# Patient Record
Sex: MALE | Race: WHITE | URBAN - METROPOLITAN AREA
[De-identification: names, ages, dates, MRNs, and addresses within clinical notes are randomized per-mention and may not be internally consistent; named-entity substitution may affect disease eponyms.]

---
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
## 2024-08-11 NOTE — CONSULTS
[facility] Pediatric Hospitalist  Consult     Norman Johnson MRN# [id number]   YOB: 2005 Age: 12 year old      Date of Admission:  5/8/2018           Chief Complaint:   S/p ORIF closed L both bone forearm fracture.    History is obtained from the patient, electronic health record, ortho NP, and patient's father         History of Present Illness:   11 yo RHD M who was playing soccer last night and fell onto his L wrist. Had immediate pain and deformity of L wrist. No numbness/tingling. No other injuries. Brought to ED where attempted closed reduction was performed and was unsuccessful.    Earlier today he underwent ORIF which was uncomplicated. He has not required opioids or other IV medications since post-op and is now tolerating PO fluids and some solids. He voided just prior to my evaluation.             Past Medical History:   I have reviewed and updated this patient's past medical history  Active Ambulatory Problems     Diagnosis Date Noted     No Active Ambulatory Problems     Resolved Ambulatory Problems     Diagnosis Date Noted     No Resolved Ambulatory Problems     No Additional Past Medical History             Past Surgical History:   I have reviewed and updated this patient's past surgical history  History reviewed. No pertinent surgical history.          Social History:   I have reviewed and updated this patient's social history      - Lives at home with family.          Family History:   I have reviewed and updated this patient's family history  History reviewed. No pertinent family history.         Immunizations:   Immunizations are up to date - reported         Allergies:   No Known Allergies          Medications:   I have reviewed this patient's current medications  None         Review of Systems:   General: normal energy and appetite.  Skin: no rash, hives, swelling, other lesions.   Eyes: no pain, discharge, redness, itching.   ENT:No ear or throat pain, no runny  "nose  Respiratory: no cough, wheeze, respiratory distress.   Cardiovascular: no tachycardia, palpitations, syncope.   Gastrointestinal: no nausea, vomiting, diarrhea, constipation, abdominal pain.   Musculoskeletal: as noted  Urinary: no dysuria, frequency, urgency. Initially with some post-op urinary retention but is now voiding.  Hematology: no anemia, bleeding disorder, abnormal lymph nodes, night sweats.   Neurology: no weakness, tingling, numbness, headache, syncope.   The 10 point Review of Systems is otherwise negative other than noted in the HPI and above           Physical Exam:   Vitals were reviewed  Initial vitals were reviewed  /54  Temp 98.2  F (36.8  C) (Oral)  Resp 16  Ht 1.664 m (5' 5.5\")  Wt 47.9 kg (105 lb 11.2 oz)  SpO2 98%  BMI 17.32 kg/m2    Constitutional:   Awake, afebrile, NTNAD, well nourished/well hydrated, appropriate responses   Head:         NCAT  Eyes:   PERRLA, EOMI, sclerae anicteric, no conjunctival injection   ENT:   mucosal membranes moist and pink   Neck:   Supple, normal ROM, trachea midline, no stridor   Hematologic / Lymphatic:   no cervical or supraclavicular lymphadenopathy   Back:   Symmetric, no curvature   Lungs:   CTAB, good aeration to bases, no WRR   Cardiovascular:   RRR with normal S1/S1, no murmur, no rubs, clicks or gallops.  2+ peripheral pulses bilaterally   Chest:   Symmetric chest wall motion, no sternal retractions   Abdomen:   Soft, non-tender, non-distended.  No guarding or peritoneal signs. No hepatomegaly, no splenomegaly. Normally active bowel sounds   Musculoskeletal/Neurologic:   Normal strength and tone, CNs grossly intact, no focal deficits/neurologically intact.   Skin:   No rashes, edema or ecchymosis.          Data:   LEFT FOREARM TWO VIEWS     5/7/2018 8:46 PM      HISTORY: Fall onto left arm with deformity at distal radius and ulna.      COMPARISON: None.         IMPRESSION: Acute distal shaft fractures of the radius and ulna. " There  is a degree of fracture fragment overlap. Acute mildly distracted  fracture of the ulnar styloid process.      HAIR GALVEZ MD          Assessment and Plan:   11yo health M s/p ORIF closed L both bone forearm fracture. Tolerating PO, pain well controlled without IV medications, able to void.       -Seems to be meeting discharge criteria at this point.  -For pain control I would recommend acetaminophen and/or ibuprofen, then oral oxycodone PRN for breakthrough pain. I entered these orders (small supply of 5mg oxycodone) as requested and discussed with the family. I would not recommend combination pain meds (such as Norco) in any pediatric patient.  -I also wrote for a small supply of oral ondansetron should any postoperative N/V develop after discharge.  -Remainder of plan including f/u and PT per primary ortho team.        Thank you very much for this interesting consult and allowing us to be involved in this patient's care.    Attestation:  This patient was seen and evaluated by me. I have reviewed the the medical record in detail, including vital signs, notes, medications, labs and imaging.  I have discussed this care plan with the patient, family and care team.    Von Olson MD  Pediatric Hospitalist  [facility]  Pager [phone]      Back pain

## (undated) DEVICE — NDL COUNTER 20CT 31142493

## (undated) DEVICE — SYR 30ML LL W/O NDL 302832

## (undated) DEVICE — NDL 22GA 1.5"

## (undated) DEVICE — CAST BUCKET

## (undated) DEVICE — LINEN FULL SHEET 5511

## (undated) DEVICE — GLOVE PROTEXIS POWDER FREE 8.5 ORTHOPEDIC 2D73ET85

## (undated) DEVICE — BAG CLEAR TRASH 1.3M 39X33" P4040C

## (undated) DEVICE — DRILL BIT QUICK COUPLING 2.5X110MM GOLD 310.25

## (undated) DEVICE — PACK MINOR LITHOTOMY RIDGES

## (undated) DEVICE — ESU GROUND PAD ADULT W/CORD E7507

## (undated) DEVICE — SU MONOCRYL 4-0 PS-2 27" UND Y426H

## (undated) DEVICE — LINEN HALF SHEET 5512

## (undated) DEVICE — DRSG ABDOMINAL 07 1/2X8" 7197D

## (undated) DEVICE — CAST PADDING 4" STERILE 9044S

## (undated) DEVICE — GLOVE PROTEXIS BLUE W/NEU-THERA 8.5  2D73EB85

## (undated) DEVICE — DRSG STERI STRIP 1/2X4" R1547

## (undated) DEVICE — SU VICRYL 3-0 SH 27" J316H

## (undated) DEVICE — DRSG ADAPTIC 3X8" 6113

## (undated) DEVICE — DRAPE C-ARM MINI 5423

## (undated) DEVICE — CAST PADDING 4" UNSTERILE 9044

## (undated) DEVICE — SU VICRYL 0 CT-2 27" J334H

## (undated) DEVICE — IMM SHOULDER MED 79-84015

## (undated) DEVICE — CAST PLASTER SPLINT 4X15" EXTRA FAST

## (undated) DEVICE — SU VICRYL 3-0 SH 27" UND J416H

## (undated) DEVICE — SYR BULB IRRIG DOVER 60 ML LATEX FREE 67000

## (undated) RX ORDER — DEXAMETHASONE SODIUM PHOSPHATE 4 MG/ML
INJECTION, SOLUTION INTRA-ARTICULAR; INTRALESIONAL; INTRAMUSCULAR; INTRAVENOUS; SOFT TISSUE
Status: DISPENSED
Start: 2018-05-08

## (undated) RX ORDER — GLYCOPYRROLATE 0.2 MG/ML
INJECTION INTRAMUSCULAR; INTRAVENOUS
Status: DISPENSED
Start: 2018-05-08

## (undated) RX ORDER — FENTANYL CITRATE 50 UG/ML
INJECTION, SOLUTION INTRAMUSCULAR; INTRAVENOUS
Status: DISPENSED
Start: 2018-05-08

## (undated) RX ORDER — BUPIVACAINE HYDROCHLORIDE 2.5 MG/ML
INJECTION, SOLUTION EPIDURAL; INFILTRATION; INTRACAUDAL
Status: DISPENSED
Start: 2018-05-08

## (undated) RX ORDER — PROPOFOL 10 MG/ML
INJECTION, EMULSION INTRAVENOUS
Status: DISPENSED
Start: 2018-05-08

## (undated) RX ORDER — LIDOCAINE HYDROCHLORIDE 10 MG/ML
INJECTION, SOLUTION EPIDURAL; INFILTRATION; INTRACAUDAL; PERINEURAL
Status: DISPENSED
Start: 2018-05-08

## (undated) RX ORDER — ONDANSETRON 2 MG/ML
INJECTION INTRAMUSCULAR; INTRAVENOUS
Status: DISPENSED
Start: 2018-05-08